# Patient Record
Sex: MALE | Race: OTHER | Employment: OTHER | URBAN - METROPOLITAN AREA
[De-identification: names, ages, dates, MRNs, and addresses within clinical notes are randomized per-mention and may not be internally consistent; named-entity substitution may affect disease eponyms.]

---

## 2022-05-31 ENCOUNTER — NEW PATIENT (OUTPATIENT)
Dept: URBAN - METROPOLITAN AREA CLINIC 4 | Facility: CLINIC | Age: 87
End: 2022-05-31

## 2022-05-31 DIAGNOSIS — H35.81: ICD-10-CM

## 2022-05-31 DIAGNOSIS — H25.13: ICD-10-CM

## 2022-05-31 PROCEDURE — 92004 COMPRE OPH EXAM NEW PT 1/>: CPT

## 2022-05-31 ASSESSMENT — VISUAL ACUITY
OD_PH: 20/100
OS_SC: 20/200
OD_SC: 20/400
OS_PH: 20/100

## 2022-05-31 ASSESSMENT — TONOMETRY
OS_IOP_MMHG: 13
OD_IOP_MMHG: 25

## 2022-05-31 NOTE — PATIENT DISCUSSION
Should patient need to go under anesthesia, would recommend he have both eyes performed at the same visit.

## 2022-05-31 NOTE — PATIENT DISCUSSION
Consider pre-op: Patient complaining of a decrease in vision consistent with cataracts. Patient instructed to test eyes individually while driving at night and during daily activities. Patient states they will consider if he is ready for a pre-op.

## 2022-07-06 RX ORDER — DONEPEZIL HYDROCHLORIDE 5 MG/1
TABLET, FILM COATED ORAL
COMMUNITY
Start: 2021-07-24

## 2022-07-06 RX ORDER — NITROGLYCERIN 80 MG/1
PATCH TRANSDERMAL
COMMUNITY

## 2022-07-06 RX ORDER — LORAZEPAM 0.5 MG/1
TABLET ORAL
COMMUNITY

## 2022-07-06 RX ORDER — DIVALPROEX SODIUM 125 MG/1
TABLET, DELAYED RELEASE ORAL
COMMUNITY
End: 2022-09-01 | Stop reason: SDUPTHER

## 2022-07-06 RX ORDER — TAMSULOSIN HYDROCHLORIDE 0.4 MG/1
1 CAPSULE ORAL
COMMUNITY

## 2022-07-19 PROBLEM — N40.1 BENIGN LOCALIZED PROSTATIC HYPERPLASIA WITH LOWER URINARY TRACT SYMPTOMS (LUTS): Status: ACTIVE | Noted: 2022-07-19

## 2022-07-19 PROBLEM — N18.30 CHRONIC KIDNEY DISEASE, STAGE III (MODERATE) (HCC): Status: ACTIVE | Noted: 2022-07-19

## 2022-07-19 PROBLEM — I10 ESSENTIAL HYPERTENSION: Status: ACTIVE | Noted: 2022-07-19

## 2022-07-19 PROBLEM — I26.94 MULTIPLE SUBSEGMENTAL PULMONARY EMBOLI WITHOUT ACUTE COR PULMONALE (HCC): Status: ACTIVE | Noted: 2022-07-19

## 2022-07-19 PROBLEM — N40.0 BENIGN PROSTATIC HYPERPLASIA WITHOUT URINARY OBSTRUCTION: Status: ACTIVE | Noted: 2022-07-19

## 2022-07-19 PROBLEM — N18.9 ANEMIA ASSOCIATED WITH CHRONIC RENAL FAILURE: Status: ACTIVE | Noted: 2022-07-19

## 2022-07-19 PROBLEM — G93.41 METABOLIC ENCEPHALOPATHY: Status: ACTIVE | Noted: 2022-07-19

## 2022-07-19 PROBLEM — F02.80 ALZHEIMER'S DISEASE WITH LATE ONSET (HCC): Status: ACTIVE | Noted: 2022-07-19

## 2022-07-19 PROBLEM — N13.9 URINARY OBSTRUCTION: Status: ACTIVE | Noted: 2022-07-19

## 2022-07-19 PROBLEM — I25.10 CORONARY ARTERY DISEASE INVOLVING NATIVE CORONARY ARTERY OF NATIVE HEART WITHOUT ANGINA PECTORIS: Status: ACTIVE | Noted: 2022-07-19

## 2022-07-19 PROBLEM — D64.89 OTHER SPECIFIED ANEMIAS: Status: ACTIVE | Noted: 2022-07-19

## 2022-07-19 PROBLEM — R00.1 BRADYCARDIA: Status: ACTIVE | Noted: 2022-07-19

## 2022-07-19 PROBLEM — D50.9 IRON DEFICIENCY ANEMIA: Status: ACTIVE | Noted: 2022-07-19

## 2022-07-19 PROBLEM — D63.1 ANEMIA ASSOCIATED WITH CHRONIC RENAL FAILURE: Status: ACTIVE | Noted: 2022-07-19

## 2022-07-19 PROBLEM — N32.0 BLADDER OBSTRUCTION: Status: ACTIVE | Noted: 2022-07-19

## 2022-07-19 PROBLEM — G30.1 ALZHEIMER'S DISEASE WITH LATE ONSET (HCC): Status: ACTIVE | Noted: 2022-07-19

## 2022-07-19 PROBLEM — F03.918 DEMENTIA WITH BEHAVIORAL DISTURBANCE: Status: ACTIVE | Noted: 2022-07-19

## 2022-07-19 PROBLEM — N18.9 CHRONIC KIDNEY DISEASE: Status: ACTIVE | Noted: 2022-07-19

## 2022-07-19 PROBLEM — H91.92 HEARING LOSS OF LEFT EAR: Status: ACTIVE | Noted: 2022-07-19

## 2022-07-19 PROBLEM — I49.5 SICK SINUS SYNDROME (HCC): Status: ACTIVE | Noted: 2022-07-19

## 2022-07-19 PROBLEM — M17.12 PRIMARY OSTEOARTHRITIS OF LEFT KNEE: Status: ACTIVE | Noted: 2022-07-19
